# Patient Record
Sex: MALE | Race: WHITE | URBAN - METROPOLITAN AREA
[De-identification: names, ages, dates, MRNs, and addresses within clinical notes are randomized per-mention and may not be internally consistent; named-entity substitution may affect disease eponyms.]

---

## 2017-12-26 ENCOUNTER — OFFICE VISIT (OUTPATIENT)
Dept: INTERNAL MEDICINE CLINIC | Age: 37
End: 2017-12-26

## 2017-12-26 VITALS
DIASTOLIC BLOOD PRESSURE: 70 MMHG | WEIGHT: 226 LBS | HEIGHT: 71 IN | SYSTOLIC BLOOD PRESSURE: 128 MMHG | HEART RATE: 78 BPM | BODY MASS INDEX: 31.64 KG/M2 | OXYGEN SATURATION: 97 %

## 2017-12-26 DIAGNOSIS — Z00.00 ANNUAL PHYSICAL EXAM: Primary | ICD-10-CM

## 2017-12-26 DIAGNOSIS — Z23 NEED FOR INFLUENZA VACCINATION: ICD-10-CM

## 2017-12-26 DIAGNOSIS — R13.19 ESOPHAGEAL DYSPHAGIA: ICD-10-CM

## 2017-12-26 PROCEDURE — 90686 IIV4 VACC NO PRSV 0.5 ML IM: CPT | Performed by: INTERNAL MEDICINE

## 2017-12-26 PROCEDURE — 99395 PREV VISIT EST AGE 18-39: CPT | Performed by: INTERNAL MEDICINE

## 2017-12-26 PROCEDURE — 90471 IMMUNIZATION ADMIN: CPT | Performed by: INTERNAL MEDICINE

## 2017-12-26 ASSESSMENT — PATIENT HEALTH QUESTIONNAIRE - PHQ9
SUM OF ALL RESPONSES TO PHQ QUESTIONS 1-9: 0
1. LITTLE INTEREST OR PLEASURE IN DOING THINGS: 0
SUM OF ALL RESPONSES TO PHQ9 QUESTIONS 1 & 2: 0
2. FEELING DOWN, DEPRESSED OR HOPELESS: 0

## 2017-12-26 NOTE — PROGRESS NOTES
Vaccine Information Sheet, \"Influenza - Inactivated\"  given to Rc Purdy, or parent/legal guardian of  Rc Purdy and verbalized understanding. Patient responses:    Have you ever had a reaction to a flu vaccine? No  Are you able to eat eggs without adverse effects? Yes  Do you have any current illness? No  Have you ever had Guillian Welcome Syndrome? No    Flu vaccine given per order. Please see immunization tab.

## 2017-12-26 NOTE — PROGRESS NOTES
Subjective:      Patient ID: Eliza Whitney is a 30 y.o. male. HPI  Here for an annual exam.  He has been doing well overall. No new concerns other than wanting to be certain he is healthy. He has 2 children, 3 and 1. Son has a wheat allergy, and some other food allergies. He has some mild dysphagia at times. Food occasionally gets stuck during swallowing, and has had some mild coughing in the last few weeks. No GERD symptoms noted, but a mild cough. Review of Systems   All other systems reviewed and are negative. No current outpatient prescriptions on file. No current facility-administered medications for this visit. Allergies:  Ceclor [cefaclor]      Past Medical History:   Diagnosis Date    Asthma     Childhood         Past Surgical History:   Procedure Laterality Date    ANTERIOR CRUCIATE LIGAMENT REPAIR Right age 14         Social History   Substance Use Topics    Smoking status: Never Smoker    Smokeless tobacco: Never Used    Alcohol use 3.6 oz/week     6 Cans of beer per week         Family History   Problem Relation Age of Onset    Other Mother      Thoracic aneurysm    High Blood Pressure Father     High Cholesterol Father     Diabetes Father     Cancer Father      Thyroid cancer    Heart Disease Father 54     Stent          Vitals:    12/26/17 1421   BP: 128/70   Site: Right Arm   Position: Sitting   Cuff Size: Large Adult   Pulse: 78   SpO2: 97%   Weight: 226 lb (102.5 kg)   Height: 5' 10.8\" (1.798 m)        Objective:   Physical Exam   Constitutional: He is oriented to person, place, and time. He appears well-developed and well-nourished. HENT:   Head: Normocephalic and atraumatic. Right Ear: External ear normal.   Left Ear: External ear normal.   Nose: Nose normal.   Mouth/Throat: Oropharynx is clear and moist. No oropharyngeal exudate. Neck: Normal range of motion. Neck supple. No thyromegaly present.    Cardiovascular: Normal rate, regular rhythm and normal heart sounds. Pulmonary/Chest: Effort normal and breath sounds normal. He has no wheezes. He has no rales. Genitourinary: Prostate normal.   Musculoskeletal: He exhibits no edema. Lymphadenopathy:     He has no cervical adenopathy. Neurological: He is alert and oriented to person, place, and time. Vitals reviewed. Assessment:      Annual exam-  Flu shot. Encouraged diet and weight loss. Return for fasting labs. Dysphagia-  Refer to GI for evaluation, may need an EGD.   Start PPI, prilosec OTC empirically      Plan:      As above

## 2018-04-03 ENCOUNTER — TELEPHONE (OUTPATIENT)
Dept: INTERNAL MEDICINE CLINIC | Age: 38
End: 2018-04-03

## 2021-07-02 PROBLEM — E78.5 HYPERLIPIDEMIA: Status: ACTIVE | Noted: 2021-07-02
